# Patient Record
Sex: FEMALE | Race: WHITE | NOT HISPANIC OR LATINO | URBAN - METROPOLITAN AREA
[De-identification: names, ages, dates, MRNs, and addresses within clinical notes are randomized per-mention and may not be internally consistent; named-entity substitution may affect disease eponyms.]

---

## 2017-01-02 ENCOUNTER — OFFICE VISIT - RIVER FALLS (OUTPATIENT)
Dept: FAMILY MEDICINE | Facility: CLINIC | Age: 22
End: 2017-01-02

## 2017-01-02 ASSESSMENT — MIFFLIN-ST. JEOR: SCORE: 1607.72

## 2017-04-03 ENCOUNTER — OFFICE VISIT - RIVER FALLS (OUTPATIENT)
Dept: FAMILY MEDICINE | Facility: CLINIC | Age: 22
End: 2017-04-03

## 2017-04-03 ASSESSMENT — MIFFLIN-ST. JEOR: SCORE: 1630.4

## 2022-02-11 VITALS
TEMPERATURE: 98.4 F | RESPIRATION RATE: 16 BRPM | HEIGHT: 63 IN | HEART RATE: 80 BPM | DIASTOLIC BLOOD PRESSURE: 82 MMHG | SYSTOLIC BLOOD PRESSURE: 114 MMHG | OXYGEN SATURATION: 99 % | BODY MASS INDEX: 34.91 KG/M2 | WEIGHT: 197 LBS

## 2022-02-11 VITALS
SYSTOLIC BLOOD PRESSURE: 120 MMHG | HEIGHT: 63 IN | TEMPERATURE: 98.7 F | HEART RATE: 102 BPM | WEIGHT: 202 LBS | BODY MASS INDEX: 35.79 KG/M2 | DIASTOLIC BLOOD PRESSURE: 72 MMHG

## 2022-02-16 NOTE — PROGRESS NOTES
Patient:   ROBERT GARRETT            MRN: [id number]            FIN: [id number]               Age:   21 years     Sex:  Female     :  1995   Associated Diagnoses:   None   Author:   Hitesh Gomez MD      Visit Information      Date of Service: 2017 05:37 pm  Performing Location: [facility]  Encounter#: [id number]      Primary Care Provider (PCP):  [id number] -FOR DISC USE ONLY,      Referring Provider:  No referring provider recorded for selected visit.      Chief Complaint   4/3/2017 5:41 PM CDT     Pt c/o cold sore on lower lip. Started today. Would also like STD testing. DW has already placed a RTC in pt's chart for testing.  (Modified)       History of Present Illness   CC as above and reviewed w  patient   Pt c/o of lower lip lesion for about 3 days. Started taking previously prescribed valtrex but wasn't able to complete course as she had run out. No fevers. Not overly bothersome but not healing too well. she wonders about superinfection. Has had a couple breakouts ni the last year  Would like std screen no symptoms      Review of Systems   Constitutional:  No fever.             Health Status   Allergies:    Allergic Reactions (Selected)  Severity Not Documented  Cephalexin (Rash)   Medications:  (Selected)   Prescriptions  Prescribed  Lexapro 20 mg oral tablet: 1 tab(s) ( 20 mg ), PO, Daily, # 90 tab(s), 1 Refill(s), Type: Maintenance, Pharmacy: [facility], 1 tab(s) po daily  Valtrex 1 g oral tablet: 2 tab(s) ( 2 gm ), po, q12 hrs, # 8 tab(s), 3 Refill(s), Type: Maintenance, Pharmacy: [id number], 2 tab(s) po q12 hrs,x2 day(s)  mupirocin 2% topical ointment: 1 jamila, TOP, TID, # 15 g, 0 Refill(s), Type: Maintenance, Pharmacy: [facility] #[id number], 1 jamila top tid  omeprazole 20 mg oral delayed release capsule: 1 cap(s) ( 20 mg ), po, daily, # 90 cap(s), 1 Refill(s), Type: Maintenance, Pharmacy: [facility], 1 cap(s) po daily,x90 day(s)    Problem list:    All Problems (Selected)  Attention deficit / ICD-9-.51 / Confirmed  Generalized Headaches / ICD-9-.0 / Confirmed  Obesity / SNOMED CT 7666434092 / Probable  Thrombophlebitis of breast / SNOMED CT 096907671 / Confirmed      Histories   Past Medical History:    Resolved  Clavicular fracture: Onset in 1999 at 3 years.  Resolved.  GI problems:  Resolved.  Comments:  1/22/2014 CST 3:16 PM CST - Howie HURLEY, Mandy  following with [facility]--had abdominal US, had Upper GI (1/2014), to schedule endoscopy   Family History:    Asthma  Mother     Procedure history:    MSLT - Multiple sleep latency test (223405397) on 4/8/2016 at 20 Years.  Comments:  4/26/2016 1:23 PM - Rachel Mondragon CMA  Normal  Polysomnogram ([id number]) on 4/7/2016 at 20 Years.  Comments:  4/26/2016 1:22 PM - Rachel Mondragon CMA  Normal  Tonsillectomy (456972003) in 2000 at 5 Years.  clavicular fracture in 1999 at 4 Years.   Social History:        Alcohol Assessment: Current            Current, 1-2 times per month      Tobacco Assessment            Never      Substance Abuse Assessment: Denies Substance Abuse            Past, Marijuana      Employment and Education Assessment            Student      Exercise and Physical Activity Assessment: Does not exercise      Sexual Assessment            Sexually active: Yes.  Sexual orientation: Heterosexual.  Contraceptive Use Details: Birth control pill.        Physical Examination   Vital Signs   4/3/2017 5:41 PM CDT Temperature Tympanic 98.7 DegF    Peripheral Pulse Rate 102 bpm  HI    Systolic Blood Pressure 120 mmHg    Diastolic Blood Pressure 72 mmHg    Mean Arterial Pressure 88 mmHg      Measurements from flowsheet : Measurements   4/3/2017 5:41 PM CDT Height Measured - Standard 63 in    Weight Measured - Standard 202 lb    BSA 2.02 m2    Body Mass Index 35.78 kg/m2      ENT - crusty lesions of lower lip. No buccal or gingival involvement. Moist membranes      Impression and  Plan   Cold sore  - treat with valtrex, pt will try carmex and also she is thinking of giving lysine a try. She also will use mupirocin ointment in case of bacterial infection    STD testing- check hiv, syphilis, gonorrhe and chlamydia screening

## 2022-02-16 NOTE — PROGRESS NOTES
Patient:   ROBERT GARRETT            MRN: [id number]            FIN: [id number]               Age:   21 years     Sex:  Female     :  1995   Associated Diagnoses:   Bacterial conjunctivitis   Author:   Kraig Quintana PA-C      Chief Complaint   2017 11:07 AM CST    Pt here for itchy left eye with redness,discharge and pain that started this morning.        History of Present Illness   Chief complaint and symptoms noted above and confirmed with patient   she woke up this am with mattering left eye, redness, some irritation  she does wear contacts and has them in today until she can her glasses      Review of Systems   Constitutional:  Negative.    Eye:       Discharge: Left.    Ear/Nose/Mouth/Throat:  Nasal congestion.    Respiratory:  Cough.       Health Status   Allergies:    Allergic Reactions (Selected)  Severity Not Documented  Cephalexin (Rash)   Medications:  (Selected)   Prescriptions  Prescribed  Lexapro 20 mg oral tablet: 1 tab(s) ( 20 mg ), PO, Daily, # 90 tab(s), 1 Refill(s), Type: Maintenance, Pharmacy: [facility], 1 tab(s) po daily  Ortho-Cyclen 0.25 mg-35 mcg oral tablet: 1 tab(s), PO, Daily, # 28 tab(s), 5 Refill(s), Type: Maintenance, Pharmacy: [id number], 1 tab(s) po daily  codeine-guaiFENesin 10 mg-100 mg/5 mL oral syrup: 5 mL, PO, q4hr, PRN: for cough, # 240 mL, 0 Refill(s), Type: Maintenance, Pharmacy: [facility], 5 mL po q4 hrs,PRN:for cough  omeprazole 20 mg oral delayed release capsule: 1 cap(s) ( 20 mg ), po, daily, # 90 cap(s), 1 Refill(s), Type: Maintenance, Pharmacy: [facility], 1 cap(s) po daily,x90 day(s)   Problem list:    All Problems  Thrombophlebitis of breast / SNOMED CT 715393584 / Confirmed  Obesity / SNOMED CT 5498772622 / Probable  Generalized Headaches / ICD-9-.0 / Confirmed  Attention deficit / ICD-9-.51 / Confirmed      Histories   Family History:    Asthma  Mother     Procedure  history:    MSLT - Multiple sleep latency test ([id number]) on 4/8/2016 at 20 Years.  Comments:  4/26/2016 1:23 PM - Rachel Mondragon CMA  Normal  Polysomnogram ([id number]) on 4/7/2016 at 20 Years.  Comments:  4/26/2016 1:22 PM - Rachel Mondragon CMA  Normal  Tonsillectomy ([id number]) in 2000 at 5 Years.  clavicular fracture in 1999 at 4 Years.      Physical Examination   Vital Signs   1/2/2017 11:07 AM CST Temperature Tympanic 98.4 DegF    Peripheral Pulse Rate 80 bpm    Pulse Site Radial artery    Respiratory Rate 16 br/min    Systolic Blood Pressure 114 mmHg    Diastolic Blood Pressure 82 mmHg    Mean Arterial Pressure 93 mmHg    BP Site Right arm    Oxygen Saturation 99 %      Measurements from flowsheet : Measurements   1/2/2017 11:07 AM CST Height Measured - Standard 63 in    Weight Measured - Standard 197 lb    BSA 1.99 m2    Body Mass Index 34.89 kg/m2      General:  No acute distress.    Eye:  Pupils are equal, round and reactive to light, Extraocular movements are intact, Vision unchanged, left sclera and conjunctiva are moderately injected, no mattering.  Right eye is mildly injected.    HENT:  Tympanic membranes are clear, No pharyngeal erythema, No sinus tenderness.    Neck:  Supple, Non-tender, No lymphadenopathy.    Respiratory:  lungs have coarse ronchi bilaterally, no wheezes, no rales.       Impression and Plan   Diagnosis     Bacterial conjunctivitis (EPN29-DS H10.023).     Patient Instructions:  no contact wear for the next week, good hand washing, follow up with ophthalmologist if worsening in any way.    Orders     Orders   Pharmacy:  tobramycin 0.3% ophthalmic solution (Prescribe): 1 drop(s), Both eyes, QID, x 7 day(s), # 5 mL, 0 Refill(s), Type: Maintenance, Pharmacy: [id number], 1 drop(s) both eyes qid,x7 day(s).     Orders   Charges (Evaluation and Management):  [id number] office outpatient visit 15 minutes (Charge) (Order): Quantity: 1, Bacterial conjunctivitis.